# Patient Record
Sex: FEMALE | Race: BLACK OR AFRICAN AMERICAN | NOT HISPANIC OR LATINO | Employment: OTHER | URBAN - METROPOLITAN AREA
[De-identification: names, ages, dates, MRNs, and addresses within clinical notes are randomized per-mention and may not be internally consistent; named-entity substitution may affect disease eponyms.]

---

## 2017-02-06 PROBLEM — Z01.419 WELL WOMAN EXAM WITH ROUTINE GYNECOLOGICAL EXAM: Status: ACTIVE | Noted: 2017-02-06

## 2017-02-08 ENCOUNTER — OFFICE VISIT (OUTPATIENT)
Dept: OBSTETRICS AND GYNECOLOGY | Facility: CLINIC | Age: 62
End: 2017-02-08

## 2017-02-08 VITALS
WEIGHT: 156 LBS | HEIGHT: 67 IN | BODY MASS INDEX: 24.48 KG/M2 | DIASTOLIC BLOOD PRESSURE: 76 MMHG | RESPIRATION RATE: 14 BRPM | SYSTOLIC BLOOD PRESSURE: 128 MMHG

## 2017-02-08 DIAGNOSIS — N95.2 ATROPHIC VAGINITIS: ICD-10-CM

## 2017-02-08 DIAGNOSIS — Z01.419 WELL WOMAN EXAM WITH ROUTINE GYNECOLOGICAL EXAM: Primary | ICD-10-CM

## 2017-02-08 PROBLEM — M06.9 RHEUMATOID ARTHRITIS, ADULT: Status: ACTIVE | Noted: 2017-02-08

## 2017-02-08 PROCEDURE — 99386 PREV VISIT NEW AGE 40-64: CPT | Performed by: OBSTETRICS & GYNECOLOGY

## 2017-02-08 RX ORDER — MELOXICAM 15 MG/1
15 TABLET ORAL DAILY
COMMUNITY

## 2017-02-08 RX ORDER — HYDROXYCHLOROQUINE SULFATE 200 MG/1
TABLET, FILM COATED ORAL DAILY
COMMUNITY

## 2017-02-08 RX ORDER — FOLIC ACID 1 MG/1
1 TABLET ORAL DAILY
COMMUNITY

## 2017-02-08 NOTE — PROGRESS NOTES
"Subjective   Chief Complaint   Patient presents with   • [facility]     Darlyn Montesinos is a 61 y.o. year old  menopausal female presenting to be seen for her annual exam.  This past year she has not been on hormone replacement therapy.  There has not been vaginal bleeding in the last 12 months.  Menopausal symptoms are not present.    SEXUAL Hx:  She is not currently sexually active because of pain.  In the past year there has not been new sexual partners.    Condoms are not typically used.  She would not like to be screened for STD's at today's exam.  HEALTH Hx:  She exercises regularly: no (and has no plans to become more active).  She wears her seat belt:yes.  She has concerns about domestic violence: no.  She has noticed changes in height: no.  OTHER COMPLAINTS:  none    The following portions of the patient's history were reviewed and updated as appropriate:problem list, current medications, allergies, past family history, past medical history, past social history and past surgical history.    Smoking status: Former Smoker                                                              Packs/day: 0.00      Years: 0.00         Types: Cigarettes     Start date:      Quit date:      Smokeless status: Not on file                         Review of Systems  Consitutional POS: nothing reported    NEG: anorexia or night sweats   Gastointestinal POS: nothing reported    NEG: bloating, change in bowel habits, melena or reflux symptoms   Genitourinary POS: nothing reported    NEG: dysuria or hematuria   Integument POS: nothing reported    NEG: moles that are changing in size, shape, color or rashes   Breast POS: nothing reported    NEG: persistent breast lump, skin dimpling or nipple discharge        Objective   Visit Vitals   • /76   • Resp 14   • Ht 67\" (170.2 cm)   • Wt 156 lb (70.8 kg)   • LMP 2012 (Approximate)   • BMI 24.43 kg/m2       General:  well developed; well nourished  no " acute distress   Skin:  No suspicious lesions seen   Thyroid: normal to inspection and palpation   Breasts:  Examined in supine position  Symmetric without masses or skin dimpling  Nipples normal without inversion, lesions or discharge  There are no palpable axillary nodes   Abdomen: soft, non-tender; no masses  no umbilical or inginual hernias are present  no hepato-splenomegaly   Pelvis: Clinical staff was present for exam  External genitalia:  normal appearance of the external genitalia including Bartholin's and [place]'s glands.  :  urethral meatus normal; urethral hypermobility is absent.  Vaginal:  atrophic mucosal changes are present;  Cervix:  normal appearance.  Uterus:  normal size, shape and consistency.  Adnexa:  non palpable bilaterally.  Rectal:  digital rectal exam not performed; anus visually normal appearing.        Assessment   1. Normal GYN exam  2. Menopausal female currently not on HRT - with significant symptoms affecting activities of daily living  3. Dyspareunia with atrophic changes     Plan   1. Pap was done today.  If she does not receive the results of the Pap within 2 weeks  time, she was instructed to call to find out the results.  I explained to Darlyn that the recommendations for Pap smear interval in a low risk patient's has lengthened to 3 years time.  I encouraged her to be seen yearly for a full physical exam including breast and pelvic exam even during the off years when PAP's will not be performed.  2. She was encouraged to continue getting yearly mammograms.  She should report any palpable breast lump(s) or skin changes regardless of mammographic findings.  I explained to Darlyn that notification regarding her mammogram results will come from the center performing the study.  Our office will not be routinely calling with mammogram results.  It is her responsibility to make sure that the results from the mammogram are communicated to her by the breast center.  If she has any  questions about the results, she is welcome to call our office anytime.  3. The following data needs to be obtained to update her medical records: last PAP, last mammogram and last colonoscopy.  4. Follow up for annual exam 1 year    New Medications Ordered This Visit   Medications   • conjugated estrogens (PREMARIN) 0.625 MG/GM vaginal cream     Sig: Use nightly for 2 weeks and then go to intravaginally 1-3 times per week to control symptoms.     Dispense:  1 each     Refill:  12          This note was electronically signed.    Lito Mcmullen M.D.  February 8, 2017

## 2017-02-08 NOTE — PATIENT INSTRUCTIONS
Atrophic Vaginitis  Atrophic vaginitis is a condition in which the tissues that line the vagina become dry and thin. This condition is most common in women who have stopped having regular menstrual periods (menopause). This usually starts when a woman is 45-55 years old.  Estrogen helps to keep the vagina moist. It stimulates the vagina to produce a clear fluid that lubricates the vagina for sexual intercourse. This fluid also protects the vagina from infection. Lack of estrogen can cause the lining of the vagina to get thinner and dryer. The vagina may also shrink in size. It may become less elastic. Atrophic vaginitis tends to get worse over time as a woman's estrogen level drops.  CAUSES  This condition is caused by the normal drop in estrogen that happens around the time of menopause.  RISK FACTORS  Certain conditions or situations may lower a woman's estrogen level, which increases her risk of atrophic vaginitis. These include:  · Taking medicine that blocks estrogen.  · Having ovaries removed surgically.  · Being treated for cancer with X-ray treatment (radiation) or medicines (chemotherapy).  · Exercising very hard and often.  · Having an eating disorder (anorexia).  · Giving birth or breastfeeding.  · Being over the age of 50.  · Smoking.  SYMPTOMS  Symptoms of this condition include:  · Pain, soreness, or bleeding during sexual intercourse (dyspareunia).  · Vaginal burning, irritation, or itching.  · Pain or bleeding during a vaginal examination using a speculum (pelvic exam).  · Loss of interest in sexual activity.  · Having burning pain when passing urine.  · Vaginal discharge that is brown or yellow.  In some cases, there are no symptoms.  DIAGNOSIS  This condition is diagnosed with a medical history and physical exam. This will include a pelvic exam that checks whether the inside of your vagina appears pale, thin, or dry. Rarely, you may also have other tests, including:  · A urine test.  · A test that  checks the acid balance in your vaginal fluid (acid balance test).  TREATMENT  Treatment for this condition may depend on the severity of your symptoms. Treatment may include:  · Using an over-the-counter vaginal lubricant before you have sexual intercourse.  · Using a long-acting vaginal moisturizer.  · Using low-dose vaginal estrogen for moderate to severe symptoms that do not respond to other treatments. Options include creams, tablets, and inserts (vaginal rings). Before using vaginal estrogen, tell your health care provider if you have a history of:    Breast cancer.    Endometrial cancer.    Blood clots.  · Taking medicines. You may be able to take a daily pill for dyspareunia. Discuss all of the risks of this medicine with your health care provider. It is usually not recommended for women who have a family history or personal history of breast cancer.  If your symptoms are very mild and you are not sexually active, you may not need treatment.  HOME CARE INSTRUCTIONS  · Take medicines only as directed by your health care provider. Do not use herbal or alternative medicines unless your health care provider says that you can.  · Use over-the-counter creams, lubricants, or moisturizers for dryness only as directed by your health care provider.  · If your atrophic vaginitis is caused by menopause, discuss all of your menopausal symptoms and treatment options with your health care provider.  · Do not douche.  · Do not use products that can make your vagina dry. These include:    Scented feminine sprays.    Scented tampons.    Scented soaps.  · If it hurts to have sex, talk with your sexual partner.  SEEK MEDICAL CARE IF:  · Your discharge looks different than normal.  · Your vagina has an unusual smell.  · You have new symptoms.  · Your symptoms do not improve with treatment.  · Your symptoms get worse.     This information is not intended to replace advice given to you by your health care provider. Make sure you  discuss any questions you have with your health care provider.     Document Released: 05/03/2016 Document Reviewed: 05/03/2016  [employer] Interactive Patient Education ©2016 [employer]

## 2022-03-24 ENCOUNTER — TRANSCRIBE ORDERS (OUTPATIENT)
Dept: ADMINISTRATIVE | Facility: HOSPITAL | Age: 67
End: 2022-03-24

## 2022-03-24 ENCOUNTER — HOSPITAL ENCOUNTER (OUTPATIENT)
Dept: GENERAL RADIOLOGY | Facility: HOSPITAL | Age: 67
Discharge: HOME | End: 2022-03-24
Admitting: NURSE PRACTITIONER

## 2022-03-24 DIAGNOSIS — R07.89 ATYPICAL CHEST PAIN: ICD-10-CM

## 2022-03-24 DIAGNOSIS — R07.89 ATYPICAL CHEST PAIN: Primary | ICD-10-CM

## 2022-03-24 PROCEDURE — 71046 X-RAY EXAM CHEST 2 VIEWS: CPT
